# Patient Record
Sex: FEMALE | Race: BLACK OR AFRICAN AMERICAN | NOT HISPANIC OR LATINO | Employment: STUDENT | URBAN - METROPOLITAN AREA
[De-identification: names, ages, dates, MRNs, and addresses within clinical notes are randomized per-mention and may not be internally consistent; named-entity substitution may affect disease eponyms.]

---
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
## 2025-05-07 ENCOUNTER — HOSPITAL ENCOUNTER (EMERGENCY)
Age: 20
Discharge: HOME | End: 2025-05-07
Payer: COMMERCIAL

## 2025-05-07 VITALS
TEMPERATURE: 97.9 F | RESPIRATION RATE: 15 BRPM | OXYGEN SATURATION: 98 % | WEIGHT: 124 LBS | SYSTOLIC BLOOD PRESSURE: 139 MMHG | BODY MASS INDEX: 21.17 KG/M2 | HEART RATE: 90 BPM | DIASTOLIC BLOOD PRESSURE: 93 MMHG | HEIGHT: 64 IN

## 2025-05-07 DIAGNOSIS — J06.9 VIRAL URI: ICD-10-CM

## 2025-05-07 DIAGNOSIS — N30.00 ACUTE CYSTITIS WITHOUT HEMATURIA: Primary | ICD-10-CM

## 2025-05-07 LAB
BACTERIA URNS QL MICRO: ABNORMAL /HPF
BILIRUB UR QL STRIP: NEGATIVE
CLARITY UR: ABNORMAL
COLOR UR: YELLOW
EPI CELLS #/AREA URNS AUTO: ABNORMAL /HPF (ref 0–5)
GLUCOSE UR STRIP-MCNC: NEGATIVE MG/DL
HCG, URINE, POC: NEGATIVE
HGB UR QL STRIP: NEGATIVE
HYALINE CASTS #/AREA URNS AUTO: ABNORMAL /HPF (ref 0–5)
INFLUENZA A BY PCR: NEGATIVE
INFLUENZA B BY PCR: NEGATIVE
KETONES UR STRIP-MCNC: 40 MG/DL
LEUKOCYTE ESTERASE UR QL STRIP: ABNORMAL
Lab: NORMAL
NEGATIVE QC PASS/FAIL: NORMAL
NITRITE UR QL STRIP: POSITIVE
PH UR STRIP: 5.5 [PH] (ref 5–9)
POSITIVE QC PASS/FAIL: NORMAL
PROT UR STRIP-MCNC: >=300 MG/DL
RBC #/AREA URNS AUTO: ABNORMAL /HPF (ref 0–5)
SARS-COV-2 RDRP RESP QL NAA+PROBE: NOT DETECTED
SP GR UR STRIP: 1.03 (ref 1–1.03)
STREP GRP A PCR: NEGATIVE
URINE REFLEX TO CULTURE: YES
UROBILINOGEN UR STRIP-ACNC: 0.2 E.U./DL
WBC #/AREA URNS AUTO: >100 /HPF (ref 0–5)

## 2025-05-07 PROCEDURE — 87502 INFLUENZA DNA AMP PROBE: CPT

## 2025-05-07 PROCEDURE — 87635 SARS-COV-2 COVID-19 AMP PRB: CPT

## 2025-05-07 PROCEDURE — 81001 URINALYSIS AUTO W/SCOPE: CPT

## 2025-05-07 PROCEDURE — 6370000000 HC RX 637 (ALT 250 FOR IP)

## 2025-05-07 PROCEDURE — 99283 EMERGENCY DEPT VISIT LOW MDM: CPT

## 2025-05-07 PROCEDURE — 87651 STREP A DNA AMP PROBE: CPT

## 2025-05-07 PROCEDURE — 87086 URINE CULTURE/COLONY COUNT: CPT

## 2025-05-07 PROCEDURE — 87077 CULTURE AEROBIC IDENTIFY: CPT

## 2025-05-07 PROCEDURE — 87186 SC STD MICRODIL/AGAR DIL: CPT

## 2025-05-07 PROCEDURE — 86403 PARTICLE AGGLUT ANTBDY SCRN: CPT

## 2025-05-07 RX ORDER — ONDANSETRON 4 MG/1
4 TABLET, ORALLY DISINTEGRATING ORAL 3 TIMES DAILY PRN
Qty: 21 TABLET | Refills: 0 | Status: SHIPPED | OUTPATIENT
Start: 2025-05-07

## 2025-05-07 RX ORDER — CEPHALEXIN 500 MG/1
500 CAPSULE ORAL 2 TIMES DAILY
Qty: 14 CAPSULE | Refills: 0 | Status: SHIPPED | OUTPATIENT
Start: 2025-05-07 | End: 2025-05-14

## 2025-05-07 RX ORDER — ONDANSETRON 4 MG/1
4 TABLET, ORALLY DISINTEGRATING ORAL ONCE
Status: COMPLETED | OUTPATIENT
Start: 2025-05-07 | End: 2025-05-07

## 2025-05-07 RX ADMIN — ONDANSETRON 4 MG: 4 TABLET, ORALLY DISINTEGRATING ORAL at 09:23

## 2025-05-07 ASSESSMENT — ENCOUNTER SYMPTOMS
RHINORRHEA: 1
SORE THROAT: 1
BACK PAIN: 0
VOMITING: 1
DIARRHEA: 0
NAUSEA: 1
ABDOMINAL PAIN: 1
COUGH: 1

## 2025-05-07 ASSESSMENT — PAIN - FUNCTIONAL ASSESSMENT: PAIN_FUNCTIONAL_ASSESSMENT: 0-10

## 2025-05-07 ASSESSMENT — PAIN DESCRIPTION - ORIENTATION: ORIENTATION: MID

## 2025-05-07 ASSESSMENT — PAIN DESCRIPTION - LOCATION: LOCATION: ABDOMEN

## 2025-05-07 ASSESSMENT — PAIN SCALES - GENERAL: PAINLEVEL_OUTOF10: 8

## 2025-05-07 NOTE — ED PROVIDER NOTES
Basic Information   Time Seen: 8:46 AM   Primary Care Provider: None, None     Chief Complaint   Patient presents with    Abdominal Pain     Mid abdominal pain n/v also co illness, co body aches and runny nose      HPI   Juan Cueva is a 19 yrs female whom per chart review has a PMHx of depression, eczema, migraines presents to ED for evaluation of generalized illness.  Patient reports generalized bodyaches, congestion, runny nose, sore throat, nausea, vomiting, generalized abdominal pain.  Patient states that symptoms have been present since Sunday, 05/04/2025.  Patient denies ill contacts, exposures however does work at a nursing facility.  Patient reports that she has been taking OTC Tylenol and endorses minimal symptom relief.  No additional complaints verbalized.  Patient denies chest pain, shortness of breath, diarrhea, fever, chills, hematuria, dysuria, urinary frequency/urgency.   Physical Exam     BP (!) 139/93 (05/07/25 0833)    Temp 97.9 °F (36.6 °C) (05/07/25 0833)    Pulse 90 (05/07/25 0833)   Resp 15 (05/07/25 0833)    SpO2 98 % (05/07/25 0833)       General: Awake and Alert, no acute distress   CV: RRR, S1, S2   Resp: LCTAB, even and non labored   Other:   Impression and Plan     Labs Reviewed   RAPID INFLUENZA A/B ANTIGENS   COVID-19, RAPID   RAPID STREP SCREEN   URINALYSIS WITH REFLEX TO CULTURE   POC PREGNANCY UR-QUAL        No orders to display      Final Impression   I have performed a medical screening exam on Juan Cueva. Based on this patient's chief complaint/symptoms of   Chief Complaint   Patient presents with    Abdominal Pain     Mid abdominal pain n/v also co illness, co body aches and runny nose    and my focused exam, their care will be started and transitioned to provider when room is available.     Promise Mohamud, IVAN-C  05/07/25 0865

## 2025-05-07 NOTE — ED PROVIDER NOTES
[facility] EMERGENCY DEPARTMENT  EMERGENCY DEPARTMENT ENCOUNTER      Pt Name: Juan Cueva  MRN: [id number]  Birthdate 2005  Date of evaluation: 5/7/2025  Provider: Sonu Carr PA-C  9:31 AM EDT    CHIEF COMPLAINT       Chief Complaint   Patient presents with    Abdominal Pain     Mid abdominal pain n/v also co illness, co body aches and runny nose         HISTORY OF PRESENT ILLNESS   (Location/Symptom, Timing/Onset, Context/Setting, Quality, Duration, Modifying Factors, Severity)  Note limiting factors.   Juan Cueva is a 19 y.o. female who presents to the emergency department with generalized illness since Sunday.  She states that she has been having lower abdominal pain since then.  She had 2 episodes of emesis today.  She states that she has been spotting recently.  She denies chance of pregnancy.  She has had soft stools.  She states that she took cough medicine and Tylenol this morning which made her nausea worse.  She complains of dry cough, congestion, runny nose, sore throat, and myalgias.  She denies chest pain, shortness of breath, fever, chills, dysuria, vaginal discharge.    HPI    Nursing Notes were reviewed.    REVIEW OF SYSTEMS    (2-9 systems for level 4, 10 or more for level 5)     Review of Systems   Constitutional:  Negative for chills and fever.   HENT:  Positive for congestion, rhinorrhea and sore throat.    Respiratory:  Positive for cough.    Cardiovascular:  Negative for chest pain.   Gastrointestinal:  Positive for abdominal pain, nausea and vomiting. Negative for diarrhea.   Genitourinary:  Negative for dysuria.   Musculoskeletal:  Positive for myalgias. Negative for back pain.   Neurological:  Positive for headaches.       Except as noted above the remainder of the review of systems was reviewed and negative.       PAST MEDICAL HISTORY   No past medical history on file.      SURGICAL HISTORY       Past Surgical History:   Procedure Laterality Date    MYRINGOTOMY      x2

## 2025-05-07 NOTE — DISCHARGE INSTRUCTIONS
Please ensure that you take Keflex as prescribed and to completion for urinary tract infection.  Please take Zofran as needed for nausea.  Please ensure that you follow-up with your primary care doctor tomorrow as previously scheduled.  Please return to the emergency department for new or worsening symptoms.

## 2025-05-09 LAB
BACTERIA UR CULT: ABNORMAL
ORGANISM: ABNORMAL
ORGANISM: ABNORMAL

## 2025-06-14 ENCOUNTER — APPOINTMENT (OUTPATIENT)
Dept: ULTRASOUND IMAGING | Age: 20
End: 2025-06-14
Payer: COMMERCIAL

## 2025-06-14 ENCOUNTER — HOSPITAL ENCOUNTER (EMERGENCY)
Age: 20
Discharge: HOME | End: 2025-06-14
Attending: STUDENT IN AN ORGANIZED HEALTH CARE EDUCATION/TRAINING PROGRAM
Payer: COMMERCIAL

## 2025-06-14 VITALS
SYSTOLIC BLOOD PRESSURE: 121 MMHG | BODY MASS INDEX: 20.55 KG/M2 | HEART RATE: 87 BPM | RESPIRATION RATE: 16 BRPM | DIASTOLIC BLOOD PRESSURE: 79 MMHG | WEIGHT: 120.4 LBS | OXYGEN SATURATION: 100 % | TEMPERATURE: 98.1 F | HEIGHT: 64 IN

## 2025-06-14 DIAGNOSIS — O26.891 ABDOMINAL PAIN DURING PREGNANCY IN FIRST TRIMESTER: ICD-10-CM

## 2025-06-14 DIAGNOSIS — R10.9 ABDOMINAL PAIN DURING PREGNANCY IN FIRST TRIMESTER: ICD-10-CM

## 2025-06-14 DIAGNOSIS — R10.9 ABDOMINAL PAIN DURING PREGNANCY, FIRST TRIMESTER: Primary | ICD-10-CM

## 2025-06-14 DIAGNOSIS — O20.0 THREATENED MISCARRIAGE IN EARLY PREGNANCY: ICD-10-CM

## 2025-06-14 DIAGNOSIS — O26.891 ABDOMINAL PAIN DURING PREGNANCY, FIRST TRIMESTER: Primary | ICD-10-CM

## 2025-06-14 LAB
ABO/RH: NORMAL
ALBUMIN SERPL-MCNC: 4.2 G/DL (ref 3.5–4.6)
ALP SERPL-CCNC: 68 U/L (ref 40–130)
ALT SERPL-CCNC: 11 U/L (ref 0–33)
AMPHET UR QL SCN: NORMAL
ANION GAP SERPL CALCULATED.3IONS-SCNC: 12 MEQ/L (ref 9–15)
ANTIBODY SCREEN: NORMAL
AST SERPL-CCNC: 13 U/L (ref 0–35)
BACTERIA URNS QL MICRO: NEGATIVE /HPF
BARBITURATES UR QL SCN: NORMAL
BASOPHILS # BLD: 0.1 K/UL (ref 0–0.2)
BASOPHILS NFR BLD: 0.9 %
BENZODIAZ UR QL SCN: NORMAL
BILIRUB SERPL-MCNC: 0.4 MG/DL (ref 0.2–0.7)
BILIRUB UR QL STRIP: NEGATIVE
BUN SERPL-MCNC: 17 MG/DL (ref 6–20)
CALCIUM SERPL-MCNC: 9.2 MG/DL (ref 8.5–9.9)
CANNABINOIDS UR QL SCN: NORMAL
CHLORIDE SERPL-SCNC: 104 MEQ/L (ref 95–107)
CK SERPL-CCNC: 39 U/L (ref 0–170)
CLARITY UR: CLEAR
CO2 SERPL-SCNC: 21 MEQ/L (ref 20–31)
COCAINE UR QL SCN: NORMAL
COLOR UR: YELLOW
CREAT SERPL-MCNC: 0.69 MG/DL (ref 0.5–0.9)
DRUG SCREEN COMMENT UR-IMP: NORMAL
EOSINOPHIL # BLD: 0.1 K/UL (ref 0–0.7)
EOSINOPHIL NFR BLD: 1.1 %
EPI CELLS #/AREA URNS AUTO: NORMAL /HPF (ref 0–5)
ERYTHROCYTE [DISTWIDTH] IN BLOOD BY AUTOMATED COUNT: 15.5 % (ref 11.5–14.5)
FENTANYL SCREEN, URINE: NORMAL
GLOBULIN SER CALC-MCNC: 3.1 G/DL (ref 2.3–3.5)
GLUCOSE SERPL-MCNC: 85 MG/DL (ref 70–99)
GLUCOSE UR STRIP-MCNC: NEGATIVE MG/DL
GONADOTROPIN, CHORIONIC (HCG) QUANT: NORMAL MIU/ML
HCT VFR BLD AUTO: 38.7 % (ref 37–47)
HGB BLD-MCNC: 12.3 G/DL (ref 12–16)
HGB UR QL STRIP: NEGATIVE
HYALINE CASTS #/AREA URNS AUTO: NORMAL /HPF (ref 0–5)
KETONES UR STRIP-MCNC: >=80 MG/DL
LACTATE BLDV-SCNC: 0.9 MMOL/L (ref 0.5–2.2)
LEUKOCYTE ESTERASE UR QL STRIP: NEGATIVE
LIPASE SERPL-CCNC: 34 U/L (ref 12–95)
LYMPHOCYTES # BLD: 1.5 K/UL (ref 1–4.8)
LYMPHOCYTES NFR BLD: 26.4 %
MAGNESIUM SERPL-MCNC: 1.9 MG/DL (ref 1.7–2.2)
MCH RBC QN AUTO: 24.5 PG (ref 27–31.3)
MCHC RBC AUTO-ENTMCNC: 31.8 % (ref 33–37)
MCV RBC AUTO: 76.9 FL (ref 79.4–94.8)
METHADONE UR QL SCN: NORMAL
MONOCYTES # BLD: 0.4 K/UL (ref 0.2–0.8)
MONOCYTES NFR BLD: 6.8 %
NEUTROPHILS # BLD: 3.7 K/UL (ref 1.4–6.5)
NEUTS SEG NFR BLD: 64.6 %
NITRITE UR QL STRIP: NEGATIVE
OPIATES UR QL SCN: NORMAL
OXYCODONE UR QL SCN: NORMAL
PCP UR QL SCN: NORMAL
PH UR STRIP: 5.5 [PH] (ref 5–9)
PLATELET # BLD AUTO: 327 K/UL (ref 130–400)
POTASSIUM SERPL-SCNC: 3.7 MEQ/L (ref 3.4–4.9)
PROPOXYPH UR QL SCN: NORMAL
PROT SERPL-MCNC: 7.3 G/DL (ref 6.3–8)
PROT UR STRIP-MCNC: 30 MG/DL
RBC # BLD AUTO: 5.03 M/UL (ref 4.2–5.4)
RBC #/AREA URNS AUTO: NORMAL /HPF (ref 0–5)
SODIUM SERPL-SCNC: 137 MEQ/L (ref 135–144)
SP GR UR STRIP: 1.03 (ref 1–1.03)
URINE REFLEX TO CULTURE: ABNORMAL
UROBILINOGEN UR STRIP-ACNC: 0.2 E.U./DL
WBC # BLD AUTO: 5.7 K/UL (ref 4.5–11)
WBC #/AREA URNS AUTO: NORMAL /HPF (ref 0–5)

## 2025-06-14 PROCEDURE — 83735 ASSAY OF MAGNESIUM: CPT

## 2025-06-14 PROCEDURE — 93975 VASCULAR STUDY: CPT

## 2025-06-14 PROCEDURE — 76817 TRANSVAGINAL US OBSTETRIC: CPT

## 2025-06-14 PROCEDURE — 85025 COMPLETE CBC W/AUTO DIFF WBC: CPT

## 2025-06-14 PROCEDURE — 80053 COMPREHEN METABOLIC PANEL: CPT

## 2025-06-14 PROCEDURE — 86900 BLOOD TYPING SEROLOGIC ABO: CPT

## 2025-06-14 PROCEDURE — 86850 RBC ANTIBODY SCREEN: CPT

## 2025-06-14 PROCEDURE — 84702 CHORIONIC GONADOTROPIN TEST: CPT

## 2025-06-14 PROCEDURE — 86901 BLOOD TYPING SEROLOGIC RH(D): CPT

## 2025-06-14 PROCEDURE — 99284 EMERGENCY DEPT VISIT MOD MDM: CPT

## 2025-06-14 PROCEDURE — 82550 ASSAY OF CK (CPK): CPT

## 2025-06-14 PROCEDURE — 80307 DRUG TEST PRSMV CHEM ANLYZR: CPT

## 2025-06-14 PROCEDURE — 36415 COLL VENOUS BLD VENIPUNCTURE: CPT

## 2025-06-14 PROCEDURE — 83690 ASSAY OF LIPASE: CPT

## 2025-06-14 PROCEDURE — 76801 OB US < 14 WKS SINGLE FETUS: CPT

## 2025-06-14 PROCEDURE — 81001 URINALYSIS AUTO W/SCOPE: CPT

## 2025-06-14 PROCEDURE — 83605 ASSAY OF LACTIC ACID: CPT

## 2025-06-14 RX ORDER — ONDANSETRON 2 MG/ML
4 INJECTION INTRAMUSCULAR; INTRAVENOUS ONCE
Status: DISCONTINUED | OUTPATIENT
Start: 2025-06-14 | End: 2025-06-14 | Stop reason: HOSPADM

## 2025-06-14 RX ORDER — ACETAMINOPHEN 500 MG
1000 TABLET ORAL ONCE
Status: DISCONTINUED | OUTPATIENT
Start: 2025-06-14 | End: 2025-06-14 | Stop reason: HOSPADM

## 2025-06-14 RX ORDER — ONDANSETRON 4 MG/1
4 TABLET, ORALLY DISINTEGRATING ORAL 3 TIMES DAILY PRN
Qty: 21 TABLET | Refills: 0 | Status: SHIPPED | OUTPATIENT
Start: 2025-06-14

## 2025-06-14 RX ORDER — ACETAMINOPHEN 500 MG
1000 TABLET ORAL EVERY 6 HOURS PRN
Qty: 60 TABLET | Refills: 0 | Status: SHIPPED | OUTPATIENT
Start: 2025-06-14

## 2025-06-14 ASSESSMENT — PAIN DESCRIPTION - DESCRIPTORS: DESCRIPTORS: CRAMPING;PRESSURE

## 2025-06-14 ASSESSMENT — PAIN DESCRIPTION - LOCATION: LOCATION: ABDOMEN

## 2025-06-14 ASSESSMENT — PAIN SCALES - GENERAL: PAINLEVEL_OUTOF10: 7

## 2025-06-14 ASSESSMENT — PAIN DESCRIPTION - PAIN TYPE: TYPE: ACUTE PAIN

## 2025-06-14 ASSESSMENT — PAIN DESCRIPTION - FREQUENCY: FREQUENCY: CONTINUOUS

## 2025-06-14 ASSESSMENT — PAIN - FUNCTIONAL ASSESSMENT: PAIN_FUNCTIONAL_ASSESSMENT: 0-10

## 2025-06-14 ASSESSMENT — PAIN DESCRIPTION - ORIENTATION: ORIENTATION: LOWER

## 2025-06-14 NOTE — ED TRIAGE NOTES
Patient arrived via private car due to slipping in the rain this morning and landing on her buttocks. Patient is 8 weeks pregnant and has since had abdominal pain, cramping and pressure. Patient denies any vaginal bleeding.   Patient is very tearful upon arrival to triage, she is A/o 4, cool and dry, ambulates by self

## 2025-06-14 NOTE — ED PROVIDER NOTES
onset 1 hour ago status post fall this morning.  Upon initial evaluation, Pt anxious, but otherwise Afebrile, HDS and in NAD. PE as noted above.  Labs, EKG, and Imaging visualized and interpreted by myself as noted above in ED Course.  Given findings, clinical presentation most likely consistent w/ threatened miscarriage with significant fetal bradycardia appreciated on ultrasound imaging as noted above in the setting of acute abdominal pain.  Although patient reporting fall lower suspicion for symptoms being a trauma induced at this time given reported fall and onset of symptoms multiple hours later.  No clear acute traumatic injuries identified on exam either.  Minimal lower abdominal TTP with abdomen soft.  Lower suspicion for uterine abruption at this time.  No evidence of UTI.  No evidence of hemodynamically significant anemia or blood loss.  Discussed findings at length with the patient who states she will follow-up with her OB.  hCG scheduled as an outpatient in 48 hours.  No current vaginal bleeding.  Rh+, no indications for RhoGAM.  Feeling improved in the ED without any interventions.  Repeat abdominal exam without any abdominal TTP.  Given findings, stable for further evaluation and management as an outpatient.  Pt was administered   Medications   acetaminophen (TYLENOL) tablet 1,000 mg (1,000 mg Oral Not Given 6/14/25 1816)   ondansetron (ZOFRAN) injection 4 mg (4 mg IntraVENous Not Given 6/14/25 1817)       Plan: Discharge home in stable condition with meds as noted below and instructions to follow up with PCP and OB/GYN. Pt stable and appropriate for further evaluation and management as an outpatient. Standard anticipatory guidance and strict return precautions given if any new or worsening symptoms. Patient understanding and amenable to the POC.      CRITICAL CARE TIME   None    FINAL IMPRESSION      1. Abdominal pain during pregnancy, first trimester    2. Threatened miscarriage in early pregnancy     3. Abdominal pain during pregnancy in first trimester          DISPOSITION/PLAN   DISPOSITION Decision To Discharge 06/14/2025 08:08:33 PM   DISPOSITION CONDITION Stable                No data to display                Discharge Medication List as of 6/14/2025  8:10 PM        START taking these medications    Details   acetaminophen (TYLENOL) 500 MG tablet Take 2 tablets by mouth every 6 hours as needed for Pain or Fever, Disp-60 tablet, R-0Normal              Franky Ballard MD  Emergency Medicine Attending Physician    (Please note that portions of this note were completed with a voice recognition program.  Efforts were made to edit the dictations but occasionally words are mis-transcribed.)     Franky Ballard MD  06/14/25 [id number]